# Patient Record
Sex: MALE | Race: ASIAN | NOT HISPANIC OR LATINO | URBAN - METROPOLITAN AREA
[De-identification: names, ages, dates, MRNs, and addresses within clinical notes are randomized per-mention and may not be internally consistent; named-entity substitution may affect disease eponyms.]

---

## 2023-06-26 ENCOUNTER — EMERGENCY (EMERGENCY)
Facility: HOSPITAL | Age: 38
LOS: 1 days | Discharge: HOME | End: 2023-06-26
Attending: EMERGENCY MEDICINE | Admitting: EMERGENCY MEDICINE
Payer: MEDICAID

## 2023-06-26 VITALS
SYSTOLIC BLOOD PRESSURE: 130 MMHG | RESPIRATION RATE: 18 BRPM | HEART RATE: 71 BPM | TEMPERATURE: 99 F | DIASTOLIC BLOOD PRESSURE: 90 MMHG | OXYGEN SATURATION: 100 %

## 2023-06-26 PROCEDURE — 93010 ELECTROCARDIOGRAM REPORT: CPT

## 2023-06-26 PROCEDURE — 99284 EMERGENCY DEPT VISIT MOD MDM: CPT

## 2023-06-26 NOTE — ED ADULT TRIAGE NOTE - CHIEF COMPLAINT QUOTE
c/o severe chest pain x 2 months. Pt difficult to obtain information from. Pt reports was given escitalopram for insomnia as PCP thinks it is causing his chest pain. Reports dizziness, SOB, headache for a couple of months.. Pt appears comfortable.

## 2023-06-27 RX ORDER — IBUPROFEN 200 MG
600 TABLET ORAL ONCE
Refills: 0 | Status: COMPLETED | OUTPATIENT
Start: 2023-06-27 | End: 2023-06-27

## 2023-06-27 RX ORDER — ACETAMINOPHEN 500 MG
975 TABLET ORAL ONCE
Refills: 0 | Status: COMPLETED | OUTPATIENT
Start: 2023-06-27 | End: 2023-06-27

## 2023-06-27 RX ORDER — LIDOCAINE 4 G/100G
1 CREAM TOPICAL ONCE
Refills: 0 | Status: COMPLETED | OUTPATIENT
Start: 2023-06-27 | End: 2023-06-27

## 2023-06-27 RX ADMIN — Medication 975 MILLIGRAM(S): at 00:42

## 2023-06-27 RX ADMIN — Medication 600 MILLIGRAM(S): at 00:42

## 2023-06-27 RX ADMIN — LIDOCAINE 1 PATCH: 4 CREAM TOPICAL at 00:42

## 2023-06-27 NOTE — ED PROVIDER NOTE - CLINICAL SUMMARY MEDICAL DECISION MAKING FREE TEXT BOX
Differential diagnosis includes but is not limited to, Costochondritis versus anxiety given that patient is having reproducible tenderness along the chest  Patient has no current risk factors/description of symptoms not in line with ACS type picture

## 2023-06-27 NOTE — ED PROVIDER NOTE - OBJECTIVE STATEMENT
INT [id number]  38M pmh depression CC chest pain started month ago, increasing today described as left and right sided comes and goes did not take anything for pain, PCP gave sleeping aids for the pain, endorses sob w/ cp but relieved w/ taking a deep breath   pt also complaining of neck pain occurring for a "long time"

## 2023-06-27 NOTE — ED PROVIDER NOTE - PATIENT PORTAL LINK FT
You can access the FollowMyHealth Patient Portal offered by [facility] by registering at the following website: [email or web address]. By joining [employer]’s FollowMyHealth portal, you will also be able to view your health information using other applications (apps) compatible with our system.

## 2023-06-27 NOTE — ED ADULT NURSE NOTE - NSFALLHARMRISKINTERV_ED_ALL_ED

## 2023-06-27 NOTE — ED ADULT NURSE NOTE - OBJECTIVE STATEMENT
38 year ago A*Ox4 history of depression c/o chest pain x 1 month and neck for " a long time". Pt took was sleeping  aids by his PCP  for pain. Denies fever, chills, sob,  dizziness, vision changes. Full ROM  in neck. Chest pain non-reproducible. Pt medicated for pain.

## 2023-06-27 NOTE — ED PROVIDER NOTE - PHYSICAL EXAMINATION
GENERAL: Awake, alert, NAD  LUNGS: CTAB, no wheezes or crackles   CARDIAC: RRR, no m/r/g tender chest wall   ABDOMEN: Soft, , non tender, non distended, no rebound, no guarding  BACK: No midline spinal tenderness, no CVA tenderness  EXT: No edema, no calf tenderness, 2+ DP pulses bilaterally, no deformities.  NEURO: A&Ox3. Moving all extremities.  SKIN: Warm and dry. No rash.  PSYCH: Normal affect. GENERAL: Awake, alert, NAD  LUNGS: CTAB, no wheezes or crackles   CARDIAC: RRR, no m/r/g tender chest wall   ABDOMEN: Soft, , non tender, non distended, no rebound, no guarding  BACK: No midline spinal tenderness, no CVA tenderness  EXT: No edema, no calf tenderness, no deformities.  NEURO: A&Ox3. Moving all extremities.  SKIN: Warm and dry. No rash.  PSYCH: Normal affect.

## 2023-06-27 NOTE — ED PROVIDER NOTE - ATTENDING CONTRIBUTION TO CARE
38-year-old male past medical history of depression presenting with 1 month of intermittent chest pain.  It is waxing and waning is both left and right-sided at random times.  Goes away spontaneously.  Saw his PCP who prescribed him sleeping medication.  Patient said the pain is better with a deep breath.    Vitals: I have reviewed the patients vital signs  General: nontoxic appearing  HEENT: Atraumatic, normocephalic, airway patent  Eyes: EOMI, tracking appropriately  Neck: no tracheal deviation  Chest/Lungs: no trauma, symmetric chest rise, speaking in complete sentences,  no resp distress  Heart: skin and extremities well perfused, regular rate and rhythm  Neuro: A+Ox3, appears non focal  MSK: no deformities  Skin: no cyanosis, no jaundice   Psych:  Normal mood and affect    38-year-old male past with depression presenting with atypical chest pain.  Differential includes but is not limited to musculoskeletal, ACS, pericarditis.  Pain is very atypical for  an ACS or pericarditis.  Not consistent S with a PE as well.

## 2023-06-27 NOTE — ED PROVIDER NOTE - NSFOLLOWUPINSTRUCTIONS_ED_ALL_ED_FT
Today you were seen in the ED for chest pain   Please follow-up with your primary care provider regards to today's event    It was found that you have No signs of medical emergency warranting further evaluation in the emergency department.    You can take Tylenol and Motrin every 8 hours for pain as needed.     Costochondritis is inflammation of the tissue (cartilage) that connects the ribs to the breastbone (sternum). This causes pain in the front of the chest. The pain usually starts slowly and involves more than one rib.    What are the causes?  The exact cause of this condition is not always known. It results from stress on the cartilage where your ribs attach to your sternum. The cause of this stress could be:  Chest injury.  Exercise or activity, such as lifting.  Severe coughing.  What increases the risk?  You are more likely to develop this condition if you:  Are female.  Are 30–40 years old.  Recently started a new exercise or work activity.  Have low levels of vitamin D.  Have a condition that makes you cough frequently.  What are the signs or symptoms?  The main symptom of this condition is chest pain. The pain:  Usually starts gradually and can be sharp or dull.  Gets worse with deep breathing, coughing, or exercise.  Gets better with rest.  May be worse when you press on the affected area of your ribs and sternum.  How is this diagnosed?  This condition is diagnosed based on your symptoms, your medical history, and a physical exam. Your health care provider will check for pain when pressing on your sternum. You may also have tests to rule out other causes of chest pain. These may include:  A chest X-ray to check for lung problems.  An ECG (electrocardiogram) to see if you have a heart problem that could be causing the pain.  An imaging scan to rule out a chest or rib fracture.  How is this treated?  This condition usually goes away on its own over time. Your health care provider may prescribe an NSAID, such as ibuprofen, to reduce pain and inflammation. Treatment may also include:  Resting and avoiding activities that make pain worse.  Applying heat or ice to the area to reduce pain and inflammation.  Doing exercises to stretch your chest muscles.  If these treatments do not help, your health care provider may inject a numbing medicine at the sternum–rib connection to help relieve the pain.    Follow these instructions at home:  Managing pain, stiffness, and swelling        If directed, put ice on the painful area. To do this:  Put ice in a plastic bag.  Place a towel between your skin and the bag.  Leave the ice on for 20 minutes, 2–3 times a day.  If directed, apply heat to the affected area as often as told by your health care provider. Use the heat source that your health care provider recommends, such as a moist heat pack or a heating pad.  Place a towel between your skin and the heat source.  Leave the heat on for 20–30 minutes.  Remove the heat if your skin turns bright red. This is especially important if you are unable to feel pain, heat, or cold. You may have a greater risk of getting burned.  Activity    Rest as told by your health care provider. Avoid activities that make pain worse. This includes any activities that use chest, abdominal, and side muscles.  Do not lift anything that is heavier than 10 lb (4.5 kg), or the limit that you are told, until your health care provider says that it is safe.  Return to your normal activities as told by your health care provider. Ask your health care provider what activities are safe for you.  General instructions    Take over-the-counter and prescription medicines only as told by your health care provider.  Keep all follow-up visits as told by your health care provider. This is important.  Contact a health care provider if:  You have chills or a fever.  Your pain does not go away or it gets worse.  You have a cough that does not go away.  Get help right away if:  You have shortness of breath.  You have severe chest pain that is not relieved by medicines, heat, or ice.  These symptoms may represent a serious problem that is an emergency. Do not wait to see if the symptoms will go away. Get medical help right away. Call your local emergency services (911 in the [place] Do not drive yourself to the hospital.    Summary  Costochondritis is inflammation of the tissue (cartilage) that connects the ribs to the breastbone (sternum).  This condition causes pain in the front of the chest.  Costochondritis results from stress on the cartilage where your ribs attach to your sternum.  Treatment may include medicines, rest, heat or ice, and exercises.  This information is not intended to replace advice given to you by your health care provider. Make sure you discuss any questions you have with your health care provider.

## 2023-11-16 ENCOUNTER — EMERGENCY (EMERGENCY)
Facility: HOSPITAL | Age: 38
LOS: 1 days | Discharge: HOME | End: 2023-11-16
Attending: EMERGENCY MEDICINE | Admitting: EMERGENCY MEDICINE
Payer: MEDICAID

## 2023-11-16 VITALS
DIASTOLIC BLOOD PRESSURE: 79 MMHG | TEMPERATURE: 99 F | OXYGEN SATURATION: 100 % | HEART RATE: 110 BPM | SYSTOLIC BLOOD PRESSURE: 115 MMHG | RESPIRATION RATE: 16 BRPM

## 2023-11-16 VITALS — HEART RATE: 85 BPM

## 2023-11-16 LAB
ALBUMIN SERPL ELPH-MCNC: 4.6 G/DL — SIGNIFICANT CHANGE UP (ref 3.3–5)
ALBUMIN SERPL ELPH-MCNC: 4.6 G/DL — SIGNIFICANT CHANGE UP (ref 3.3–5)
ALP SERPL-CCNC: 85 U/L — SIGNIFICANT CHANGE UP (ref 40–120)
ALP SERPL-CCNC: 85 U/L — SIGNIFICANT CHANGE UP (ref 40–120)
ALT FLD-CCNC: 52 U/L — HIGH (ref 4–41)
ALT FLD-CCNC: 52 U/L — HIGH (ref 4–41)
ANION GAP SERPL CALC-SCNC: 10 MMOL/L — SIGNIFICANT CHANGE UP (ref 7–14)
ANION GAP SERPL CALC-SCNC: 10 MMOL/L — SIGNIFICANT CHANGE UP (ref 7–14)
AST SERPL-CCNC: 22 U/L — SIGNIFICANT CHANGE UP (ref 4–40)
AST SERPL-CCNC: 22 U/L — SIGNIFICANT CHANGE UP (ref 4–40)
BASOPHILS # BLD AUTO: 0.04 K/UL — SIGNIFICANT CHANGE UP (ref 0–0.2)
BASOPHILS # BLD AUTO: 0.04 K/UL — SIGNIFICANT CHANGE UP (ref 0–0.2)
BASOPHILS NFR BLD AUTO: 0.4 % — SIGNIFICANT CHANGE UP (ref 0–2)
BASOPHILS NFR BLD AUTO: 0.4 % — SIGNIFICANT CHANGE UP (ref 0–2)
BILIRUB SERPL-MCNC: 0.5 MG/DL — SIGNIFICANT CHANGE UP (ref 0.2–1.2)
BILIRUB SERPL-MCNC: 0.5 MG/DL — SIGNIFICANT CHANGE UP (ref 0.2–1.2)
BUN SERPL-MCNC: 11 MG/DL — SIGNIFICANT CHANGE UP (ref 7–23)
BUN SERPL-MCNC: 11 MG/DL — SIGNIFICANT CHANGE UP (ref 7–23)
CALCIUM SERPL-MCNC: 9.6 MG/DL — SIGNIFICANT CHANGE UP (ref 8.4–10.5)
CALCIUM SERPL-MCNC: 9.6 MG/DL — SIGNIFICANT CHANGE UP (ref 8.4–10.5)
CHLORIDE SERPL-SCNC: 102 MMOL/L — SIGNIFICANT CHANGE UP (ref 98–107)
CHLORIDE SERPL-SCNC: 102 MMOL/L — SIGNIFICANT CHANGE UP (ref 98–107)
CO2 SERPL-SCNC: 26 MMOL/L — SIGNIFICANT CHANGE UP (ref 22–31)
CO2 SERPL-SCNC: 26 MMOL/L — SIGNIFICANT CHANGE UP (ref 22–31)
CREAT SERPL-MCNC: 0.79 MG/DL — SIGNIFICANT CHANGE UP (ref 0.5–1.3)
CREAT SERPL-MCNC: 0.79 MG/DL — SIGNIFICANT CHANGE UP (ref 0.5–1.3)
D DIMER BLD IA.RAPID-MCNC: <150 NG/ML DDU — SIGNIFICANT CHANGE UP
D DIMER BLD IA.RAPID-MCNC: <150 NG/ML DDU — SIGNIFICANT CHANGE UP
EGFR: 116 ML/MIN/1.73M2 — SIGNIFICANT CHANGE UP
EGFR: 116 ML/MIN/1.73M2 — SIGNIFICANT CHANGE UP
EOSINOPHIL # BLD AUTO: 0.07 K/UL — SIGNIFICANT CHANGE UP (ref 0–0.5)
EOSINOPHIL # BLD AUTO: 0.07 K/UL — SIGNIFICANT CHANGE UP (ref 0–0.5)
EOSINOPHIL NFR BLD AUTO: 0.7 % — SIGNIFICANT CHANGE UP (ref 0–6)
EOSINOPHIL NFR BLD AUTO: 0.7 % — SIGNIFICANT CHANGE UP (ref 0–6)
GLUCOSE SERPL-MCNC: 102 MG/DL — HIGH (ref 70–99)
GLUCOSE SERPL-MCNC: 102 MG/DL — HIGH (ref 70–99)
HCT VFR BLD CALC: 47.3 % — SIGNIFICANT CHANGE UP (ref 39–50)
HCT VFR BLD CALC: 47.3 % — SIGNIFICANT CHANGE UP (ref 39–50)
HGB BLD-MCNC: 16.4 G/DL — SIGNIFICANT CHANGE UP (ref 13–17)
HGB BLD-MCNC: 16.4 G/DL — SIGNIFICANT CHANGE UP (ref 13–17)
IANC: 6.97 K/UL — SIGNIFICANT CHANGE UP (ref 1.8–7.4)
IANC: 6.97 K/UL — SIGNIFICANT CHANGE UP (ref 1.8–7.4)
IMM GRANULOCYTES NFR BLD AUTO: 0.3 % — SIGNIFICANT CHANGE UP (ref 0–0.9)
IMM GRANULOCYTES NFR BLD AUTO: 0.3 % — SIGNIFICANT CHANGE UP (ref 0–0.9)
LYMPHOCYTES # BLD AUTO: 2.32 K/UL — SIGNIFICANT CHANGE UP (ref 1–3.3)
LYMPHOCYTES # BLD AUTO: 2.32 K/UL — SIGNIFICANT CHANGE UP (ref 1–3.3)
LYMPHOCYTES # BLD AUTO: 22.4 % — SIGNIFICANT CHANGE UP (ref 13–44)
LYMPHOCYTES # BLD AUTO: 22.4 % — SIGNIFICANT CHANGE UP (ref 13–44)
MCHC RBC-ENTMCNC: 30.1 PG — SIGNIFICANT CHANGE UP (ref 27–34)
MCHC RBC-ENTMCNC: 30.1 PG — SIGNIFICANT CHANGE UP (ref 27–34)
MCHC RBC-ENTMCNC: 34.7 GM/DL — SIGNIFICANT CHANGE UP (ref 32–36)
MCHC RBC-ENTMCNC: 34.7 GM/DL — SIGNIFICANT CHANGE UP (ref 32–36)
MCV RBC AUTO: 86.8 FL — SIGNIFICANT CHANGE UP (ref 80–100)
MCV RBC AUTO: 86.8 FL — SIGNIFICANT CHANGE UP (ref 80–100)
MONOCYTES # BLD AUTO: 0.93 K/UL — HIGH (ref 0–0.9)
MONOCYTES # BLD AUTO: 0.93 K/UL — HIGH (ref 0–0.9)
MONOCYTES NFR BLD AUTO: 9 % — SIGNIFICANT CHANGE UP (ref 2–14)
MONOCYTES NFR BLD AUTO: 9 % — SIGNIFICANT CHANGE UP (ref 2–14)
NEUTROPHILS # BLD AUTO: 6.97 K/UL — SIGNIFICANT CHANGE UP (ref 1.8–7.4)
NEUTROPHILS # BLD AUTO: 6.97 K/UL — SIGNIFICANT CHANGE UP (ref 1.8–7.4)
NEUTROPHILS NFR BLD AUTO: 67.2 % — SIGNIFICANT CHANGE UP (ref 43–77)
NEUTROPHILS NFR BLD AUTO: 67.2 % — SIGNIFICANT CHANGE UP (ref 43–77)
NRBC # BLD: 0 /100 WBCS — SIGNIFICANT CHANGE UP (ref 0–0)
NRBC # BLD: 0 /100 WBCS — SIGNIFICANT CHANGE UP (ref 0–0)
NRBC # FLD: 0 K/UL — SIGNIFICANT CHANGE UP (ref 0–0)
NRBC # FLD: 0 K/UL — SIGNIFICANT CHANGE UP (ref 0–0)
PLATELET # BLD AUTO: 213 K/UL — SIGNIFICANT CHANGE UP (ref 150–400)
PLATELET # BLD AUTO: 213 K/UL — SIGNIFICANT CHANGE UP (ref 150–400)
POTASSIUM SERPL-MCNC: 3.8 MMOL/L — SIGNIFICANT CHANGE UP (ref 3.5–5.3)
POTASSIUM SERPL-MCNC: 3.8 MMOL/L — SIGNIFICANT CHANGE UP (ref 3.5–5.3)
POTASSIUM SERPL-SCNC: 3.8 MMOL/L — SIGNIFICANT CHANGE UP (ref 3.5–5.3)
POTASSIUM SERPL-SCNC: 3.8 MMOL/L — SIGNIFICANT CHANGE UP (ref 3.5–5.3)
PROT SERPL-MCNC: 7.2 G/DL — SIGNIFICANT CHANGE UP (ref 6–8.3)
PROT SERPL-MCNC: 7.2 G/DL — SIGNIFICANT CHANGE UP (ref 6–8.3)
RBC # BLD: 5.45 M/UL — SIGNIFICANT CHANGE UP (ref 4.2–5.8)
RBC # BLD: 5.45 M/UL — SIGNIFICANT CHANGE UP (ref 4.2–5.8)
RBC # FLD: 11.8 % — SIGNIFICANT CHANGE UP (ref 10.3–14.5)
RBC # FLD: 11.8 % — SIGNIFICANT CHANGE UP (ref 10.3–14.5)
SODIUM SERPL-SCNC: 138 MMOL/L — SIGNIFICANT CHANGE UP (ref 135–145)
SODIUM SERPL-SCNC: 138 MMOL/L — SIGNIFICANT CHANGE UP (ref 135–145)
TROPONIN T, HIGH SENSITIVITY RESULT: 7 NG/L — SIGNIFICANT CHANGE UP
WBC # BLD: 10.36 K/UL — SIGNIFICANT CHANGE UP (ref 3.8–10.5)
WBC # BLD: 10.36 K/UL — SIGNIFICANT CHANGE UP (ref 3.8–10.5)
WBC # FLD AUTO: 10.36 K/UL — SIGNIFICANT CHANGE UP (ref 3.8–10.5)
WBC # FLD AUTO: 10.36 K/UL — SIGNIFICANT CHANGE UP (ref 3.8–10.5)

## 2023-11-16 PROCEDURE — 93010 ELECTROCARDIOGRAM REPORT: CPT | Mod: 77

## 2023-11-16 PROCEDURE — 71046 X-RAY EXAM CHEST 2 VIEWS: CPT | Mod: 26

## 2023-11-16 PROCEDURE — 99285 EMERGENCY DEPT VISIT HI MDM: CPT

## 2023-11-16 PROCEDURE — 93010 ELECTROCARDIOGRAM REPORT: CPT

## 2023-11-16 RX ORDER — NITROGLYCERIN 6.5 MG
0.4 CAPSULE, EXTENDED RELEASE ORAL ONCE
Refills: 0 | Status: COMPLETED | OUTPATIENT
Start: 2023-11-16 | End: 2023-11-16

## 2023-11-16 RX ORDER — KETOROLAC TROMETHAMINE 30 MG/ML
15 SYRINGE (ML) INJECTION ONCE
Refills: 0 | Status: DISCONTINUED | OUTPATIENT
Start: 2023-11-16 | End: 2023-11-16

## 2023-11-16 RX ORDER — ASPIRIN/CALCIUM CARB/MAGNESIUM 324 MG
162 TABLET ORAL DAILY
Refills: 0 | Status: DISCONTINUED | OUTPATIENT
Start: 2023-11-16 | End: 2023-11-19

## 2023-11-16 RX ORDER — MORPHINE SULFATE 50 MG/1
2 CAPSULE, EXTENDED RELEASE ORAL ONCE
Refills: 0 | Status: DISCONTINUED | OUTPATIENT
Start: 2023-11-16 | End: 2023-11-16

## 2023-11-16 RX ADMIN — MORPHINE SULFATE 2 MILLIGRAM(S): 50 CAPSULE, EXTENDED RELEASE ORAL at 15:20

## 2023-11-16 RX ADMIN — Medication 162 MILLIGRAM(S): at 13:18

## 2023-11-16 RX ADMIN — Medication 15 MILLIGRAM(S): at 13:18

## 2023-11-16 RX ADMIN — Medication 0.4 MILLIGRAM(S): at 13:09

## 2023-11-16 NOTE — ED ADULT NURSE NOTE - OBJECTIVE STATEMENT
Pt. presents to intake 1 c/o Chest pain and pressure. Pt. states the pain in his chest woke him up at 4 am and has been going on since then. Pt. states it feels like a pressure like something sitting on his chest. Denies any PMHx/PSHx. denies SOB abd pain n/v/d dysuria fever/chills. LAC18G labs sent medicated per order. placed on monitor.

## 2023-11-16 NOTE — ED ADULT TRIAGE NOTE - CHIEF COMPLAINT QUOTE
pt /co 8/10 chest pain/ pressure staring while sleeping at 4am, + sob. pt states chest pressure comes and goes at this time. denies fever, cough, congestions, med hx

## 2023-11-16 NOTE — ED PROVIDER NOTE - PROGRESS NOTE DETAILS
Repeat EKG unchanged from triage. Old EKG 6/23 normal in comparison. No change after SL NTG. Poss. pericarditis with new diffuse SUSAN, SUSAN in II > III, no reciprocal changes, pr elevations. Will send troponin in any case. Less likely VTE/PE. Anton, Attending  Patient signed out to me. 39 y/o male with no pmhx presenting with chest pain. EKGs reported to be concerning for pericarditis with diffuse ST elevations and ID depressions, first trop 7, D-Dimer <150, 2nd EKG unchanged from prior, with plan to d/c for outpatient f/u if rpt trop stable with improvement/resolution of chest pain. Rpt trop 7 and patient reports resolution of chest pain and requesting to go home. Will d/c with return precautions and cardiology f/u. Anton, Attending  Patient signed out to me. 37 y/o male with no pmhx presenting with chest pain. EKGs reported to be concerning for pericarditis with diffuse ST elevations and MA depressions, first trop 7, D-Dimer <150, 2nd EKG unchanged from prior, with plan to d/c for outpatient f/u if rpt trop stable with improvement/resolution of chest pain. Rpt trop 7 and patient reports resolution of chest pain and requesting to go home. Will d/c with return precautions and cardiology f/u. Spoke with patients friend on phone who will be picking him up and also gave instructions (patient declined translation services)

## 2023-11-16 NOTE — ED PROVIDER NOTE - PATIENT PORTAL LINK FT
You can access the FollowMyHealth Patient Portal offered by [facility] by registering at the following website: [email or web address]. By joining [employer]’s FollowMyHealth portal, you will also be able to view your health information using other applications (apps) compatible with our system.

## 2023-11-16 NOTE — ED ADULT TRIAGE NOTE - ESI TRIAGE ACUITY LEVEL, MLM
3 You can access the FollowMyHealth Patient Portal offered by [facility] by registering at the following website: [email or web address]. By joining [employer]’s FollowMyHealth portal, you will also be able to view your health information using other applications (apps) compatible with our system.

## 2023-11-16 NOTE — ED PROVIDER NOTE - NSFOLLOWUPINSTRUCTIONS_ED_ALL_ED_FT
You were seen today in the emergency room for chest pain. Although the testing done today indicates that your pain is not from an acute emergency, your pain could still represent a problem with your heart. You need to follow up with your doctor and/or a cardiologist in the next 48-72 hours. If you develop any new or worsening symptoms you need to return immediately to the emergency department. If you experience any of the following please come right back to the emergency room: chest pain that becomes much worse with walking up stairs or exercising, uncontrollable nausea and vomiting, severe chest pain that will not go away, passing out, new persistent numbness and/or weakness. If we discussed that this pain was likely due to a muscle strain or sprain you should take over the counter medications such as Tylenol. You should avoid taking medications such as ibuprofen, Motrin, Advil or other NSAIDs until you speak with your doctor about your pain.    ???? ?????? ???? ?????? ?? ????? ????? ???? ????? ???? ?? ??? ??????? ?????? ??? ?? ????? ????? ???? ????? ????? ?????? ???? ???, ????? ????? ???? ????? ??????? ???? ???? ?????? ???????? ???? ????? ?????? ??????? 48-72 ?????? ????? ????? ??????? ???/???? ???? ??????????????? ???? ??????? ???? ???? ????? ??? ???? ???? ?? ????? ????? ???? ???? ????? ?????? ???????? ????? ?????? ???? ???? ???? ???? ??? ??????????????? ????? ????? ????? ???? ??? ??????? ??? ????? ????? ???? ????: ???? ????? ?? ??? ????    What is acute pericarditis? Acute pericarditis is inflammation of the pericardium. The pericardium is the thin sac that surrounds your heart. A small amount of clear fluid between the heart and the sac allows the heart to beat easily. With acute pericarditis, the amount of fluid increases and may contain pus. This can lead to problems with the way that your heart beats.  Pericarditis    What causes acute pericarditis? The cause may not be known. Pericarditis may be caused by any of the following:    Injuries or accidents that cause a hard blow to the chest and damage the sac    Germs, such as viruses and bacteria, or an infection in another area of your body that spreads to the sac    Medicines such as those used to treat high blood pressure, cancer, and infection    Heart surgery or radiation therapy    A heart attack that damages the heart muscle    Fluid and chemical buildup in your body and around your heart from kidney failure    Autoimmune diseases, cancer, or tuberculosis (TB) that damages the sac or increases the amount of fluid    A growing baby during pregnancy that pushes on your heart and causes problems  What are the signs and symptoms of acute pericarditis? The signs and symptoms may appear suddenly and worsen quickly. You may have any of the following:    Pain in your chest that becomes worse when you lie down    Fast heartbeat    Shortness of breath    Fever    Feeling more tired than usual and getting tired easily  How is acute pericarditis diagnosed? Your healthcare provider will examine you and ask you about medical problems that you have had in the past. He or she will listen to your heart. You may also have any of the following tests:    Blood tests are used to give healthcare providers information about how your body is working.    Telemetry is continuous monitoring of your heart rhythm. Sticky pads placed on your skin connect to an EKG machine that records your heart rhythm.    X-ray pictures of your lungs and heart may be used to check the fluid around your heart. Chest x-rays may show signs of infection around your heart.    An echocardiogram is a type of ultrasound. Sound waves are used to show the structure and function of your heart.    Transesophageal echocardiogram (SAIRA) is a type of ultrasound that shows pictures of the size and shape of your heart. It also looks at how your heart moves when it is beating. You may also need a SAIRA to check for certain problems such as blood clots or infection inside the heart.    CT scan or MRI pictures may be used to check the amount of fluid around your heart. You may be given contrast liquid before the pictures are taken. Tell the healthcare provider if you have ever had an allergic reaction to contrast liquid. Do not enter the MRI room with any metal objects. Metal can cause serious injury. Tell the provider if you have any metal in or on your body.    Pericardiocentesis is a procedure used to drain extra fluid from the sac. The fluid is sent to a lab to be checked for infection.    Pericardial biopsy is a procedure used to remove a small piece of the heart sac. The piece is sent to a lab for tests.  How is acute pericarditis treated?    NSAIDs help decrease swelling and pain or fever. This medicine is available with or without a doctor's order. NSAIDs can cause stomach bleeding or kidney problems in certain people. If you take blood thinner medicine, always ask your healthcare provider if NSAIDs are safe for you. Always read the medicine label and follow directions.    Antibiotics help prevent or treat a bacterial infection.    Steroid medicine helps lower inflammation.  What can I do to prevent infections? The following can help prevent the spread of viruses and bacteria that can cause acute pericarditis or make it worse:    Wash your hands often. Wash your hands several times each day. Wash after you use the bathroom, change a child's diaper, and before you prepare or eat food. Use soap and water every time. Rub your soapy hands together, lacing your fingers. Wash the front and back of your hands, and in between your fingers. Use the fingers of one hand to scrub under the fingernails of the other hand. Wash for at least 20 seconds. Rinse with warm, running water for several seconds. Then dry your hands with a clean towel or paper towel. Use hand  that contains alcohol if soap and water are not available. Do not touch your eyes, nose, or mouth without washing your hands first.  Handwashing      Cover a sneeze or cough. Use a tissue that covers your mouth and nose. Throw the tissue away in a trash can right away. Use the bend of your arm if a tissue is not available. Wash your hands well with soap and water or use a hand .    Clean surfaces often. Clean doorknobs, countertops, cell phones, and other surfaces that are touched often. Use a disinfecting wipe, a single-use sponge, or a cloth you can wash and reuse. Use disinfecting  if you do not have wipes. You can create a disinfecting  by mixing 1 part bleach with 10 parts water.    Ask about vaccines you may need. Vaccines help protect against viruses and bacteria that cause certain diseases. Your healthcare provider can tell you which vaccines you may need and when to get them.  Get the influenza (flu) vaccine as soon as recommended each year. The flu vaccine is usually available starting in September or October. Flu viruses change, so it is important to get a flu vaccine every year.    Get the pneumonia vaccine if recommended. This vaccine is usually recommended every 5 years. Your provider will tell you when to get this vaccine, if needed.  When should I seek immediate care?    You have shortness of breath that is worse when you lie down.    Your chest pain gets worse or does not get better.  When should I call my doctor?    You have a fever.    You have questions or concerns about your condition or care.

## 2023-11-16 NOTE — ED PROVIDER NOTE - CLINICAL SUMMARY MEDICAL DECISION MAKING FREE TEXT BOX
38 M complaining of chest pain since 4 AM, mild worsening in past few hours, associated shortness of breath, mild pleuritic pain.  No radiation to jaw or arms, but patient states it is now going towards upper chest and shoulders.  We will repeat EKG, empiric NTG and reassess, labs, chest x-ray.

## 2023-11-16 NOTE — ED ADULT NURSE NOTE - CCCP TRG CHIEF CMPLNT
White discharge in urine since Saturday. Today developing right flank pain. Pt endorses sharp pain with urination. VSS on RA.    
chest discomfort

## 2023-11-16 NOTE — ED PROVIDER NOTE - NSFOLLOWUPCLINICS_GEN_ALL_ED_FT
Cardiology at [facility]  Cardiology  [address] [place] [address]  Phone: [phone]  Fax:     Cardiology at [facility]  Cardiology  [address] [place] [address]  Phone: [phone]  Fax:

## 2023-11-16 NOTE — ED PROVIDER NOTE - OBJECTIVE STATEMENT
38 M was in usual state of health yesterday, awoke at 4 AM complaining of chest pain.  Patient with midsternal pressure-like pain and feeling short of breath.  States this symptom has been constant and persisted, is mildly pleuritic, no radiation, he thought it was improving initially but 2 hours ago became stronger and so decided to go to ED.  Denies recent illness, no fever/chills, no cough/congestion, no N/V, no diaphoresis prior to arrival but in ED complains of mild sweats.  Denies travel/trauma.  No complaints of edema/orthopnea/PND.  No leg swelling or pain.  Denies other medical problems or prescription medication.  States was evaluated for dyspnea similar chest pain about 5 months ago and had a negative work-up in the ED.

## 2023-11-16 NOTE — ED ADULT NURSE NOTE - NSFALLUNIVINTERV_ED_ALL_ED
Bed/Stretcher in lowest position, wheels locked, appropriate side rails in place/Call bell, personal items and telephone in reach/Instruct patient to call for assistance before getting out of bed/chair/stretcher/Non-slip footwear applied when patient is off stretcher/[place] to call system/Physically safe environment - no spills, clutter or unnecessary equipment/Purposeful proactive rounding/Room/bathroom lighting operational, light cord in reach

## 2023-11-20 ENCOUNTER — APPOINTMENT (OUTPATIENT)
Dept: CARDIOLOGY | Facility: CLINIC | Age: 38
End: 2023-11-20
Payer: MEDICAID

## 2023-11-20 ENCOUNTER — NON-APPOINTMENT (OUTPATIENT)
Age: 38
End: 2023-11-20

## 2023-11-20 VITALS
OXYGEN SATURATION: 96 % | DIASTOLIC BLOOD PRESSURE: 81 MMHG | WEIGHT: 146 LBS | HEART RATE: 72 BPM | HEIGHT: 63 IN | SYSTOLIC BLOOD PRESSURE: 115 MMHG | BODY MASS INDEX: 25.87 KG/M2

## 2023-11-20 PROBLEM — Z78.9 OTHER SPECIFIED HEALTH STATUS: Chronic | Status: ACTIVE | Noted: 2023-11-16

## 2023-11-20 PROCEDURE — 36415 COLL VENOUS BLD VENIPUNCTURE: CPT

## 2023-11-20 PROCEDURE — 93000 ELECTROCARDIOGRAM COMPLETE: CPT

## 2023-11-20 PROCEDURE — 99204 OFFICE O/P NEW MOD 45 MIN: CPT | Mod: 25

## 2023-11-27 LAB
CHOLEST SERPL-MCNC: 175 MG/DL
ESTIMATED AVERAGE GLUCOSE: 114 MG/DL
HBA1C MFR BLD HPLC: 5.6 %
HDLC SERPL-MCNC: 26 MG/DL
LDLC SERPL CALC-MCNC: 49 MG/DL
NONHDLC SERPL-MCNC: 149 MG/DL
TRIGL SERPL-MCNC: 694 MG/DL

## 2023-12-13 ENCOUNTER — APPOINTMENT (OUTPATIENT)
Dept: CV DIAGNOSITCS | Facility: HOSPITAL | Age: 38
End: 2023-12-13

## 2023-12-13 ENCOUNTER — OUTPATIENT (OUTPATIENT)
Dept: OUTPATIENT SERVICES | Facility: HOSPITAL | Age: 38
LOS: 1 days | End: 2023-12-13
Payer: MEDICAID

## 2023-12-13 DIAGNOSIS — R07.89 OTHER CHEST PAIN: ICD-10-CM

## 2023-12-13 DIAGNOSIS — R06.02 SHORTNESS OF BREATH: ICD-10-CM

## 2023-12-13 PROCEDURE — 93306 TTE W/DOPPLER COMPLETE: CPT | Mod: 26

## 2023-12-18 ENCOUNTER — APPOINTMENT (OUTPATIENT)
Dept: CARDIOLOGY | Facility: CLINIC | Age: 38
End: 2023-12-18
Payer: MEDICAID

## 2023-12-18 ENCOUNTER — NON-APPOINTMENT (OUTPATIENT)
Age: 38
End: 2023-12-18

## 2023-12-18 VITALS
HEART RATE: 89 BPM | DIASTOLIC BLOOD PRESSURE: 72 MMHG | OXYGEN SATURATION: 94 % | WEIGHT: 146 LBS | SYSTOLIC BLOOD PRESSURE: 105 MMHG | BODY MASS INDEX: 25.87 KG/M2 | HEIGHT: 63 IN

## 2023-12-18 DIAGNOSIS — R06.02 SHORTNESS OF BREATH: ICD-10-CM

## 2023-12-18 DIAGNOSIS — E78.5 HYPERLIPIDEMIA, UNSPECIFIED: ICD-10-CM

## 2023-12-18 DIAGNOSIS — Z00.00 ENCOUNTER FOR GENERAL ADULT MEDICAL EXAMINATION W/OUT ABNORMAL FINDINGS: ICD-10-CM

## 2023-12-18 DIAGNOSIS — R07.89 OTHER CHEST PAIN: ICD-10-CM

## 2023-12-18 PROCEDURE — 99214 OFFICE O/P EST MOD 30 MIN: CPT | Mod: 25

## 2023-12-18 PROCEDURE — 93000 ELECTROCARDIOGRAM COMPLETE: CPT

## 2023-12-18 NOTE — CARDIOLOGY SUMMARY
[de-identified] : NSR [de-identified] :  1. Left ventricular cavity is normal. Left ventricular wall thickness is normal. Left ventricular systolic function is normal with an ejection fraction of 59 % by Irving's method of disks.  2. Normal right ventricular cavity size and systolic function.  3. Structurally normal mitral valve with normal leaflet excursion. There is trace mitral regurgitation.

## 2023-12-18 NOTE — HISTORY OF PRESENT ILLNESS
[FreeTextEntry1] : Pt is a 38 year old M with no significant PMH who presents to the cardiology clinic for symptoms of chest pain.   He has been having this pain for a long time, over a year. He has been evaluated in the ED twice in the past year for this as well.  He describes the pain as a substernal pressure. He does not know what makes the pain come on, however, he states that sometimes he has it at rest and sometimes it comes on with activity. He usually feels it most in the morning when he wakes up. taking deep breaths sometimes makes the pain better. When it comes on it can last seconds to minutes. There are times where he can go a few days without having the pain, but as of November he has been having it more regularly.

## 2023-12-18 NOTE — ASSESSMENT
[FreeTextEntry1] : #Chest Pain  - Atypical chest pain, likely non cardiac. Per pt pain has now resolved.  - TTE was unremarkable.   #SOB - Associated with the CP.  - Maybe related to anxiety as well - TTE was unremarkable.   #HCM - A1C in control.  - Lipid panel with high TG, however, pt was not fasting at the time of the test.  - Will plan to repeat while fasting.  - Pt advised about healthy diet and exercise

## 2024-06-17 ENCOUNTER — APPOINTMENT (OUTPATIENT)
Dept: CARDIOLOGY | Facility: CLINIC | Age: 39
End: 2024-06-17

## 2025-04-27 ENCOUNTER — EMERGENCY (EMERGENCY)
Facility: HOSPITAL | Age: 40
LOS: 1 days | End: 2025-04-27
Attending: STUDENT IN AN ORGANIZED HEALTH CARE EDUCATION/TRAINING PROGRAM | Admitting: STUDENT IN AN ORGANIZED HEALTH CARE EDUCATION/TRAINING PROGRAM
Payer: MEDICAID

## 2025-04-27 VITALS
RESPIRATION RATE: 22 BRPM | OXYGEN SATURATION: 95 % | DIASTOLIC BLOOD PRESSURE: 65 MMHG | HEART RATE: 110 BPM | TEMPERATURE: 100 F | HEIGHT: 64 IN | SYSTOLIC BLOOD PRESSURE: 110 MMHG | WEIGHT: 147.93 LBS

## 2025-04-27 VITALS
RESPIRATION RATE: 16 BRPM | SYSTOLIC BLOOD PRESSURE: 112 MMHG | HEART RATE: 92 BPM | DIASTOLIC BLOOD PRESSURE: 75 MMHG | TEMPERATURE: 100 F | OXYGEN SATURATION: 96 %

## 2025-04-27 LAB
ALBUMIN SERPL ELPH-MCNC: 4.3 G/DL — SIGNIFICANT CHANGE UP (ref 3.3–5)
ALP SERPL-CCNC: 102 U/L — SIGNIFICANT CHANGE UP (ref 40–120)
ALT FLD-CCNC: 80 U/L — HIGH (ref 4–41)
ANION GAP SERPL CALC-SCNC: 14 MMOL/L — SIGNIFICANT CHANGE UP (ref 7–14)
APPEARANCE UR: CLEAR — SIGNIFICANT CHANGE UP
APTT BLD: 37.4 SEC — HIGH (ref 26.1–36.8)
AST SERPL-CCNC: 45 U/L — HIGH (ref 4–40)
BASOPHILS # BLD AUTO: 0.02 K/UL — SIGNIFICANT CHANGE UP (ref 0–0.2)
BASOPHILS NFR BLD AUTO: 0.4 % — SIGNIFICANT CHANGE UP (ref 0–2)
BILIRUB SERPL-MCNC: 0.3 MG/DL — SIGNIFICANT CHANGE UP (ref 0.2–1.2)
BILIRUB UR-MCNC: NEGATIVE — SIGNIFICANT CHANGE UP
BLOOD GAS VENOUS COMPREHENSIVE RESULT: SIGNIFICANT CHANGE UP
BUN SERPL-MCNC: 9 MG/DL — SIGNIFICANT CHANGE UP (ref 7–23)
CALCIUM SERPL-MCNC: 9.3 MG/DL — SIGNIFICANT CHANGE UP (ref 8.4–10.5)
CHLORIDE SERPL-SCNC: 100 MMOL/L — SIGNIFICANT CHANGE UP (ref 98–107)
CO2 SERPL-SCNC: 23 MMOL/L — SIGNIFICANT CHANGE UP (ref 22–31)
COLOR SPEC: YELLOW — SIGNIFICANT CHANGE UP
CREAT SERPL-MCNC: 0.84 MG/DL — SIGNIFICANT CHANGE UP (ref 0.5–1.3)
DIFF PNL FLD: NEGATIVE — SIGNIFICANT CHANGE UP
EGFR: 112 ML/MIN/1.73M2 — SIGNIFICANT CHANGE UP
EGFR: 112 ML/MIN/1.73M2 — SIGNIFICANT CHANGE UP
EOSINOPHIL # BLD AUTO: 0.05 K/UL — SIGNIFICANT CHANGE UP (ref 0–0.5)
EOSINOPHIL NFR BLD AUTO: 1 % — SIGNIFICANT CHANGE UP (ref 0–6)
FLUAV AG NPH QL: SIGNIFICANT CHANGE UP
FLUBV AG NPH QL: SIGNIFICANT CHANGE UP
GLUCOSE SERPL-MCNC: 113 MG/DL — HIGH (ref 70–99)
GLUCOSE UR QL: NEGATIVE MG/DL — SIGNIFICANT CHANGE UP
HCT VFR BLD CALC: 48.7 % — SIGNIFICANT CHANGE UP (ref 39–50)
HGB BLD-MCNC: 16.9 G/DL — SIGNIFICANT CHANGE UP (ref 13–17)
IANC: 2.43 K/UL — SIGNIFICANT CHANGE UP (ref 1.8–7.4)
IMM GRANULOCYTES NFR BLD AUTO: 0.6 % — SIGNIFICANT CHANGE UP (ref 0–0.9)
INR BLD: 0.96 RATIO — SIGNIFICANT CHANGE UP (ref 0.85–1.16)
KETONES UR-MCNC: NEGATIVE MG/DL — SIGNIFICANT CHANGE UP
LEUKOCYTE ESTERASE UR-ACNC: NEGATIVE — SIGNIFICANT CHANGE UP
LYMPHOCYTES # BLD AUTO: 1.66 K/UL — SIGNIFICANT CHANGE UP (ref 1–3.3)
LYMPHOCYTES # BLD AUTO: 34.8 % — SIGNIFICANT CHANGE UP (ref 13–44)
MCHC RBC-ENTMCNC: 30.1 PG — SIGNIFICANT CHANGE UP (ref 27–34)
MCHC RBC-ENTMCNC: 34.7 G/DL — SIGNIFICANT CHANGE UP (ref 32–36)
MCV RBC AUTO: 86.8 FL — SIGNIFICANT CHANGE UP (ref 80–100)
MONOCYTES # BLD AUTO: 0.58 K/UL — SIGNIFICANT CHANGE UP (ref 0–0.9)
MONOCYTES NFR BLD AUTO: 12.2 % — SIGNIFICANT CHANGE UP (ref 2–14)
NEUTROPHILS # BLD AUTO: 2.43 K/UL — SIGNIFICANT CHANGE UP (ref 1.8–7.4)
NEUTROPHILS NFR BLD AUTO: 51 % — SIGNIFICANT CHANGE UP (ref 43–77)
NITRITE UR-MCNC: NEGATIVE — SIGNIFICANT CHANGE UP
NRBC # BLD AUTO: 0 K/UL — SIGNIFICANT CHANGE UP (ref 0–0)
NRBC # FLD: 0 K/UL — SIGNIFICANT CHANGE UP (ref 0–0)
NRBC BLD AUTO-RTO: 0 /100 WBCS — SIGNIFICANT CHANGE UP (ref 0–0)
PH UR: 6 — SIGNIFICANT CHANGE UP (ref 5–8)
PLATELET # BLD AUTO: 180 K/UL — SIGNIFICANT CHANGE UP (ref 150–400)
POTASSIUM SERPL-MCNC: 3.9 MMOL/L — SIGNIFICANT CHANGE UP (ref 3.5–5.3)
POTASSIUM SERPL-SCNC: 3.9 MMOL/L — SIGNIFICANT CHANGE UP (ref 3.5–5.3)
PROT SERPL-MCNC: 7.5 G/DL — SIGNIFICANT CHANGE UP (ref 6–8.3)
PROT UR-MCNC: NEGATIVE MG/DL — SIGNIFICANT CHANGE UP
PROTHROM AB SERPL-ACNC: 11.1 SEC — SIGNIFICANT CHANGE UP (ref 9.9–13.4)
RBC # BLD: 5.61 M/UL — SIGNIFICANT CHANGE UP (ref 4.2–5.8)
RBC # FLD: 11.9 % — SIGNIFICANT CHANGE UP (ref 10.3–14.5)
RSV RNA NPH QL NAA+NON-PROBE: SIGNIFICANT CHANGE UP
SARS-COV-2 RNA SPEC QL NAA+PROBE: SIGNIFICANT CHANGE UP
SODIUM SERPL-SCNC: 137 MMOL/L — SIGNIFICANT CHANGE UP (ref 135–145)
SOURCE RESPIRATORY: SIGNIFICANT CHANGE UP
SP GR SPEC: 1.01 — SIGNIFICANT CHANGE UP (ref 1–1.03)
UROBILINOGEN FLD QL: 0.2 MG/DL — SIGNIFICANT CHANGE UP (ref 0.2–1)
WBC # BLD: 4.77 K/UL — SIGNIFICANT CHANGE UP (ref 3.8–10.5)
WBC # FLD AUTO: 4.77 K/UL — SIGNIFICANT CHANGE UP (ref 3.8–10.5)

## 2025-04-27 PROCEDURE — 71046 X-RAY EXAM CHEST 2 VIEWS: CPT | Mod: 26

## 2025-04-27 PROCEDURE — 99284 EMERGENCY DEPT VISIT MOD MDM: CPT

## 2025-04-27 PROCEDURE — 93010 ELECTROCARDIOGRAM REPORT: CPT

## 2025-04-27 RX ORDER — CEFDINIR 250 MG/5ML
1 POWDER, FOR SUSPENSION ORAL
Qty: 14 | Refills: 0
Start: 2025-04-27 | End: 2025-05-03

## 2025-04-27 RX ORDER — AZITHROMYCIN 250 MG
500 CAPSULE ORAL ONCE
Refills: 0 | Status: COMPLETED | OUTPATIENT
Start: 2025-04-27 | End: 2025-04-27

## 2025-04-27 RX ORDER — ACETAMINOPHEN 500 MG/5ML
1000 LIQUID (ML) ORAL ONCE
Refills: 0 | Status: COMPLETED | OUTPATIENT
Start: 2025-04-27 | End: 2025-04-27

## 2025-04-27 RX ORDER — IPRATROPIUM BROMIDE AND ALBUTEROL SULFATE .5; 2.5 MG/3ML; MG/3ML
3 SOLUTION RESPIRATORY (INHALATION) ONCE
Refills: 0 | Status: COMPLETED | OUTPATIENT
Start: 2025-04-27 | End: 2025-04-27

## 2025-04-27 RX ORDER — CEFTRIAXONE 500 MG/1
1000 INJECTION, POWDER, FOR SOLUTION INTRAMUSCULAR; INTRAVENOUS ONCE
Refills: 0 | Status: COMPLETED | OUTPATIENT
Start: 2025-04-27 | End: 2025-04-27

## 2025-04-27 RX ORDER — ALBUTEROL SULFATE 2.5 MG/3ML
2 VIAL, NEBULIZER (ML) INHALATION ONCE
Refills: 0 | Status: COMPLETED | OUTPATIENT
Start: 2025-04-27 | End: 2025-04-27

## 2025-04-27 RX ADMIN — Medication 400 MILLIGRAM(S): at 14:07

## 2025-04-27 RX ADMIN — Medication 250 MILLIGRAM(S): at 13:46

## 2025-04-27 RX ADMIN — Medication 2100 MILLILITER(S): at 13:07

## 2025-04-27 RX ADMIN — Medication 2100 MILLILITER(S): at 15:39

## 2025-04-27 RX ADMIN — CEFTRIAXONE 100 MILLIGRAM(S): 500 INJECTION, POWDER, FOR SOLUTION INTRAMUSCULAR; INTRAVENOUS at 13:07

## 2025-04-27 RX ADMIN — IPRATROPIUM BROMIDE AND ALBUTEROL SULFATE 3 MILLILITER(S): .5; 2.5 SOLUTION RESPIRATORY (INHALATION) at 13:09

## 2025-04-27 RX ADMIN — Medication 2 PUFF(S): at 15:49

## 2025-04-27 RX ADMIN — Medication 500 MILLIGRAM(S): at 15:04

## 2025-04-27 NOTE — ED PROVIDER NOTE - CLINICAL SUMMARY MEDICAL DECISION MAKING FREE TEXT BOX
40-year-old male with no significant past medical history presents to the emergency department complaining of 2 weeks of fevers chills, cough, shortness of breath, body aches.  Patient states he saw his doctor a week ago who did not start him on any medications and then saw him again 3 days ago and was given a course of azithromycin for which he is currently on day #3(did not take today's dose).  Patient states he has had no improvement in symptoms. Will obtain labs, cxr, IVF, IV antibiotics, duo neb, reassess.

## 2025-04-27 NOTE — ED ADULT NURSE REASSESSMENT NOTE - NS ED NURSE REASSESS COMMENT FT1
Pt resting in room 1 in wellness. 700mL left to infuse of bolus, JEB Lerner made aware. Pt endorses he feels much better. Airway is patent, respirations are even and unlabored. VS as noted in the flow sheet. Plan of care ongoing, safety maintained.

## 2025-04-27 NOTE — ED ADULT NURSE NOTE - OBJECTIVE STATEMENT
Pt arrives to wellness. Pt is A and Ox[employer] and ambulatory. [id number] [place]  used. Pt arrives to the ED complaining of shortness of breath, fever/chills, chest pain with coughing, cough/congestion x 2 weeks. Pt was dx and given abx for bronchitis without relief. Airway is patent, respirations are even and unlabored.  Skin is clean, dry, intact, and appropriate for race.  20 G IV placed in the RAC. Labs sent per provider orders. Pt medicated per MAR. Plan of care ongoing, safety maintained.

## 2025-04-27 NOTE — ED PROVIDER NOTE - ATTENDING APP SHARED VISIT CONTRIBUTION OF CARE
The patient presented to the ED with viral syndrome as above, no respiratory distress, non-toxic, lungs clear to auscultation bilaterally, heart regular rate and rhythm, no murmurs rubs or gallops., abdomen soft, non-tender, non-distended, workup negative in ed. see results. stable for discharge. Return precautions reviewed. Patient/family verbalized understand of conditions for return and plan for follow up. Patient/family was instructed to utilize [id number] to obtain follow up as indicated.

## 2025-04-27 NOTE — ED ADULT TRIAGE NOTE - CHIEF COMPLAINT QUOTE
pt c/.o of fever, cough cold for 2 weeks, c/o of mild sob as well, pt currently being treated for bronchitis

## 2025-04-27 NOTE — ED PROVIDER NOTE - OBJECTIVE STATEMENT
40-year-old male with no significant past medical history presents to the emergency department complaining of 2 weeks of fevers chills, cough, shortness of breath, body aches.  Patient states he saw his doctor a week ago who did not start him on any medications and then saw him again 3 days ago and was given a course of azithromycin for which he is currently on day #3(did not take today's dose).  Patient states he has had no improvement in symptoms.  Patient denies nausea, vomiting, abdominal pain, sick contacts, recent travel, abdominal pain, chest pain.  H&P obtained via  ID# [id number].

## 2025-04-27 NOTE — ED PROVIDER NOTE - PROGRESS NOTE DETAILS
JEB Lerner: pt feels better ambulating without difficulty.  Results reviewed with patient.  Discharge reviewed and discussed with patient. JEB Lerner: pt feels better ambulating without difficulty.  Results reviewed with patient.  Discharge reviewed and discussed with patient.  Pt discharged via  ID# [id number]

## 2025-04-27 NOTE — ED PROVIDER NOTE - PATIENT PORTAL LINK FT
You can access the FollowMyHealth Patient Portal offered by [facility] by registering at the following website: [email or web address]. By joining [employer]’s FollowMyHealth portal, you will also be able to view your health information using other applications (apps) compatible with our system.

## 2025-04-27 NOTE — ED PROVIDER NOTE - NSFOLLOWUPINSTRUCTIONS_ED_ALL_ED_FT
Follow up with your Doctor in 1-2 days.  Rest.  Drink plenty of fluids.  Take Tylenol 650mg orally every 6 hours as needed for fever/pain.  Take Cefdinir 300mg orally 2 x a day x 7 days.  Continue course of Azithromycin starting tomorrow 4/28.  Return to the ER for any persistent/worsening or new symptoms weakness, dizziness, chest pain, shortness of breath, abdominal pain or any concerning symptoms. Follow up with your Doctor in 1-2 days.  Rest.  Drink plenty of fluids.  Take Tylenol 650mg orally every 6 hours as needed for fever/pain.  Albuterol inhaler 2 puffs every 6 hours as needed for wheeze/cough.   Take Cefdinir 300mg orally 2 x a day x 7 days.  Continue course of Azithromycin starting tomorrow 4/28.  Return to the ER for any persistent/worsening or new symptoms weakness, dizziness, chest pain, shortness of breath, abdominal pain or any concerning symptoms.

## 2025-05-02 LAB
CULTURE RESULTS: SIGNIFICANT CHANGE UP
CULTURE RESULTS: SIGNIFICANT CHANGE UP
SPECIMEN SOURCE: SIGNIFICANT CHANGE UP
SPECIMEN SOURCE: SIGNIFICANT CHANGE UP